# Patient Record
Sex: MALE | Race: OTHER | HISPANIC OR LATINO | URBAN - METROPOLITAN AREA
[De-identification: names, ages, dates, MRNs, and addresses within clinical notes are randomized per-mention and may not be internally consistent; named-entity substitution may affect disease eponyms.]

---

## 2021-01-25 ENCOUNTER — OUTPATIENT (OUTPATIENT)
Dept: OUTPATIENT SERVICES | Facility: HOSPITAL | Age: 26
LOS: 1 days | End: 2021-01-25
Payer: MEDICAID

## 2021-01-25 DIAGNOSIS — Z20.828 CONTACT WITH AND (SUSPECTED) EXPOSURE TO OTHER VIRAL COMMUNICABLE DISEASES: ICD-10-CM

## 2021-01-25 LAB — SARS-COV-2 RNA SPEC QL NAA+PROBE: SIGNIFICANT CHANGE UP

## 2021-01-25 PROCEDURE — C9803: CPT

## 2021-01-25 PROCEDURE — U0005: CPT

## 2021-01-25 PROCEDURE — U0003: CPT

## 2021-01-26 DIAGNOSIS — Z20.828 CONTACT WITH AND (SUSPECTED) EXPOSURE TO OTHER VIRAL COMMUNICABLE DISEASES: ICD-10-CM

## 2022-03-28 ENCOUNTER — EMERGENCY (EMERGENCY)
Facility: HOSPITAL | Age: 27
LOS: 1 days | Discharge: HOME | End: 2022-03-28
Attending: STUDENT IN AN ORGANIZED HEALTH CARE EDUCATION/TRAINING PROGRAM
Payer: MEDICAID

## 2022-03-28 VITALS
HEART RATE: 96 BPM | HEIGHT: 62 IN | TEMPERATURE: 98 F | RESPIRATION RATE: 18 BRPM | WEIGHT: 160.06 LBS | SYSTOLIC BLOOD PRESSURE: 137 MMHG | DIASTOLIC BLOOD PRESSURE: 89 MMHG | OXYGEN SATURATION: 100 %

## 2022-03-28 LAB
APPEARANCE UR: CLEAR — SIGNIFICANT CHANGE UP
BACTERIA # UR AUTO: ABNORMAL
BILIRUB UR-MCNC: NEGATIVE — SIGNIFICANT CHANGE UP
COLOR SPEC: YELLOW — SIGNIFICANT CHANGE UP
DIFF PNL FLD: NEGATIVE — SIGNIFICANT CHANGE UP
EPI CELLS # UR: SIGNIFICANT CHANGE UP
GLUCOSE UR QL: NEGATIVE MG/DL — SIGNIFICANT CHANGE UP
KETONES UR-MCNC: NEGATIVE — SIGNIFICANT CHANGE UP
LEUKOCYTE ESTERASE UR-ACNC: NEGATIVE — SIGNIFICANT CHANGE UP
NITRITE UR-MCNC: NEGATIVE — SIGNIFICANT CHANGE UP
PH UR: 5 — SIGNIFICANT CHANGE UP (ref 5–8)
PROT UR-MCNC: NEGATIVE — SIGNIFICANT CHANGE UP
RAPID RVP RESULT: SIGNIFICANT CHANGE UP
RBC CASTS # UR COMP ASSIST: SIGNIFICANT CHANGE UP /HPF (ref 0–4)
SARS-COV-2 RNA SPEC QL NAA+PROBE: SIGNIFICANT CHANGE UP
SP GR SPEC: 1.02 — SIGNIFICANT CHANGE UP (ref 1.01–1.02)
UROBILINOGEN FLD QL: NEGATIVE MG/DL — SIGNIFICANT CHANGE UP
WBC UR QL: SIGNIFICANT CHANGE UP /HPF (ref 0–5)

## 2022-03-28 PROCEDURE — 99285 EMERGENCY DEPT VISIT HI MDM: CPT | Mod: 25

## 2022-03-28 PROCEDURE — 93005 ELECTROCARDIOGRAM TRACING: CPT

## 2022-03-28 PROCEDURE — 87086 URINE CULTURE/COLONY COUNT: CPT

## 2022-03-28 PROCEDURE — 81001 URINALYSIS AUTO W/SCOPE: CPT

## 2022-03-28 PROCEDURE — 82962 GLUCOSE BLOOD TEST: CPT

## 2022-03-28 PROCEDURE — 93010 ELECTROCARDIOGRAM REPORT: CPT

## 2022-03-28 PROCEDURE — 99284 EMERGENCY DEPT VISIT MOD MDM: CPT

## 2022-03-28 PROCEDURE — 96372 THER/PROPH/DIAG INJ SC/IM: CPT

## 2022-03-28 PROCEDURE — 0225U NFCT DS DNA&RNA 21 SARSCOV2: CPT

## 2022-03-28 PROCEDURE — 71045 X-RAY EXAM CHEST 1 VIEW: CPT

## 2022-03-28 PROCEDURE — 71045 X-RAY EXAM CHEST 1 VIEW: CPT | Mod: 26

## 2022-03-28 RX ORDER — IBUPROFEN 200 MG
600 TABLET ORAL ONCE
Refills: 0 | Status: COMPLETED | OUTPATIENT
Start: 2022-03-28 | End: 2022-03-28

## 2022-03-28 RX ORDER — DEXAMETHASONE 0.5 MG/5ML
10 ELIXIR ORAL ONCE
Refills: 0 | Status: COMPLETED | OUTPATIENT
Start: 2022-03-28 | End: 2022-03-28

## 2022-03-28 RX ORDER — METHOCARBAMOL 500 MG/1
1 TABLET, FILM COATED ORAL
Qty: 30 | Refills: 0
Start: 2022-03-28 | End: 2022-04-06

## 2022-03-28 RX ORDER — METHOCARBAMOL 500 MG/1
1500 TABLET, FILM COATED ORAL ONCE
Refills: 0 | Status: COMPLETED | OUTPATIENT
Start: 2022-03-28 | End: 2022-03-28

## 2022-03-28 RX ORDER — ACETAMINOPHEN 500 MG
975 TABLET ORAL ONCE
Refills: 0 | Status: COMPLETED | OUTPATIENT
Start: 2022-03-28 | End: 2022-03-28

## 2022-03-28 RX ORDER — LIDOCAINE 4 G/100G
1 CREAM TOPICAL ONCE
Refills: 0 | Status: COMPLETED | OUTPATIENT
Start: 2022-03-28 | End: 2022-03-28

## 2022-03-28 RX ADMIN — Medication 600 MILLIGRAM(S): at 15:36

## 2022-03-28 RX ADMIN — METHOCARBAMOL 1500 MILLIGRAM(S): 500 TABLET, FILM COATED ORAL at 15:36

## 2022-03-28 RX ADMIN — Medication 975 MILLIGRAM(S): at 15:36

## 2022-03-28 RX ADMIN — LIDOCAINE 1 PATCH: 4 CREAM TOPICAL at 15:37

## 2022-03-28 RX ADMIN — Medication 10 MILLIGRAM(S): at 15:36

## 2022-03-28 NOTE — ED PROVIDER NOTE - ATTENDING CONTRIBUTION TO CARE
The patient seen and examined    Lumbosacral pain    I, Jose Antonio Bal, performed the initial face to face bedside interview with this patient regarding history of present illness, review of symptoms and relevant past medical, social and family history.  I completed an independent physical examination.  I was the initial provider who evaluated this patient. I have signed out the follow up of any pending tests (i.e. labs, radiological studies) to the resident.  I have communicated the patient’s plan of care and disposition with the resident

## 2022-03-28 NOTE — ED PROVIDER NOTE - NSFOLLOWUPCLINICSTOKEN_GEN_ALL_ED_FT
361276:1-3 Days|| ||00\01||False; 751535:1-3 Days|| ||00\01||False; 254597:1-3 Days|| ||00\01||False;

## 2022-03-28 NOTE — ED ADULT TRIAGE NOTE - INTERNATIONAL TRAVEL DAYS 1
0-6 days ([facility]) 0-6 days ([facility]) 0-6 days ([facility])

## 2022-03-28 NOTE — ED PROVIDER NOTE - PROGRESS NOTE DETAILS
JK - ECG, UA, CXR WNL. Patient reports significant improvement in his HA and lower back pain with Robaxin, Decadron, Tylenol, Motrin. Robaxin scripts sent to pharmacy; VSS, resting comfortably, in no apparent distress, currently asymptomatic. Ready and agreeable to DC with close PCP follow up and return precautions.

## 2022-03-28 NOTE — ED PROVIDER NOTE - PHYSICAL EXAMINATION
Constitutional: Well appearing, awake, alert, oriented to person, place, and time/situation and in no apparent distress  ENMT: Airway patent nasal mucosa clear. Mouth with normal mucosa. Throat has no vesicles no oropharyngeal exudates and uvula is midline. No blood in the oropharynx  EYES: clear bilaterally, pupils equal, round and reactive to light  Cardiac: Regular rate, regular rhythm. Heart sounds S1, S2. No murmurs, rubs or gallops. Good capillary refill, 2+ pulses, no peripheral edema  Respiratory: Lungs CTAB, no use of accessory muscles, no crackles, satting 99% on RA in no distress  Gastrointestinal: Abdomen nondistended, non-tender, no rebound guarding or peritoneal signs  Genitourinary: No CVA tenderness, pelvis nontender, bladder nondistended  Musculoskeletal: Spine appears normal, range of motion is not limited, no midline spinal tenderness, mild R paraspinal tenderness, no stepoffs or deformities, sensation and reflexes intact, negative straight leg, 2+ pulses, capillary refill < 2 seconds, ambulating on his own without assistance   Neurological: Alert and oriented, no focal deficits, no motor or sensory deficits. CN 2-12 intact, PERRLA, EOMI, No FND, moving all extremities equally, sensation intact, No dysmetria, no ataxia, negative heel-shin, 5/5 strength   Skin: Skin normal color for race, warm, dry and intact, No evidence of rash

## 2022-03-28 NOTE — ED ADULT NURSE NOTE - NSIMPLEMENTINTERV_GEN_ALL_ED
Implemented All Universal Safety Interventions:  [place] to call system. Call bell, personal items and telephone within reach. Instruct patient to call for assistance. Room bathroom lighting operational. Non-slip footwear when patient is off stretcher. Physically safe environment: no spills, clutter or unnecessary equipment. Stretcher in lowest position, wheels locked, appropriate side rails in place. Implemented All Universal Safety Interventions:  [place] to call system. Call bell, personal items and telephone within reach. Instruct patient to call for assistance. Room bathroom lighting operational. Non-slip footwear when patient is off stretcher. Physically safe environment: no spills, clutter or unnecessary equipment. Stretcher in lowest position, wheels locked, appropriate side rails in place. Implemented All Universal Safety Interventions:  [place] to call system. Call bell, personal items and telephone within reach. Instruct patient to call for assistance. Room bathroom lighting operational. Non-slip footwear when patient is off stretcher. Physically safe environment: no spills, clutter or unnecessary equipment. Stretcher in lowest position, wheels locked, appropriate side rails in place.

## 2022-03-28 NOTE — ED PROVIDER NOTE - PROVIDER TOKENS
PROVIDER:[TOKEN:[[id number]]] PROVIDER:[TOKEN:[[id number]]] PROVIDER:[TOKEN:[[id number]]]

## 2022-03-28 NOTE — ED PROVIDER NOTE - NSFOLLOWUPCLINICS_GEN_ALL_ED_FT
[facility]oped[facility]  Orthopedics  [address] [place] [address]  Phone: [phone]  Fax:   Follow Up Time: 1-3 Days     [facility]  Orthopedics  [address] [place] [address]  Phone: [phone]  Fax:   Follow Up Time: 1-3 Days     [facility] General Orthoped[facility]  Orthopedics  [address] [place] [address]  Phone: [phone]  Fax:   Follow Up Time: 1-3 Days

## 2022-03-28 NOTE — ED PROVIDER NOTE - CARE PROVIDER_API CALL
Clifford De Anda)  Orthopaedic Surgery  [address] [place] [address]  Phone: [phone]  Fax: [phone]  Follow Up Time:    Clifford De Anda)  Orthopaedic Surgery  [address] [place] [address]  Phone: [phone]  Fax: [phone]  Follow Up Time:    Clifford De Anda)  Orthopaedic Surgery  [address] [place] [address]  Phone: [phone]  Fax: [phone]  Follow Up Time:

## 2022-03-28 NOTE — ED ADULT NURSE NOTE - OBJECTIVE STATEMENT
Assumed care of the Pt AOx3 in no acute distress. Pt complaining of lower back pain that is chronic for the past 2-3 years along with intermittent face tingling and HA. Pt Denies any chest pain, shortness of breath, nausea or dizziness at this time. Pt denies any trama to the area. Pt meds given as per orders, labs sent as per orders pt educated on plan of care, pt able to successfully teach back plan of care to RN, RN will continue to reeducate pt during hospital stay.

## 2022-03-28 NOTE — ED PROVIDER NOTE - PATIENT PORTAL LINK FT
You can access the FollowMyHealth Patient Portal offered by [facility] by registering at the following website: [email or web address]. By joining [employer]’s FollowMyHealth portal, you will also be able to view your health information using other applications (apps) compatible with our system. You can access the FollowMyHealth Patient Portal offered by [facility] by registering at the following website: [email or web address]. By joining [employer]’s FollowMyHealth portal, you will also be able to view your health information using other applications (apps) compatible with our system. You can access the FollowMyHealth Patient Portal offered by [facility] by registering at the following website: [email or web address]. By joining [employer]’s FollowMyHealth portal, you will also be able to view your health information using other applications (apps) compatible with our system.

## 2022-03-28 NOTE — ED ADULT NURSE NOTE - NSFALLRSKPASTHIST_ED_ALL_ED
no Detail Level: Simple Comment: Pt has history of vague autoimmune Sx, diagnosis not clear (at least to me), is seeing rheum\\nWith intermittent scaling scalp rash \\nFavor seb derm, but cannot rule out scalp dermatomyositis rash without biopsy\\nWill biopsy to r/o cutaneous connective tissue disease

## 2022-03-28 NOTE — ED PROVIDER NOTE - CLINICAL SUMMARY MEDICAL DECISION MAKING FREE TEXT BOX
Patient is a 27 yo male with no significant PMHx presenting with chronic back pain, HA, and 2 months of dull CP and SOB. Patient states he has had chronic R paraspinal back pain for 2-3 years for which he was seeing a PT without any improvement; he has also had a chronic dull 5/10 intermittent bilateral frontal HA for approximately the same time period. Denies any n/v, fevers, headstrike, LOC, falls blood thinners; patient has felt intermittent L facial tingling when the HA comes on. Patient states he has been in [facility] for the past 3 months and just returned this past Saturday; he has had 2 months of SOB at rest, dull 5/10 intermittent nonradiating CP. Patient is vaccinated x 3, denies cough congestion rhinorrhea or sick contacts. He has not been taking any medications for his symptoms. Patient also endorsing 1 month of increased urinary frequency and dysuria, denies any sexual activity. VSS, lungs CTAB, equal pulses, mild R paraspinal lumbosacral TTP no midline tenderness, full ROM at hips knees ankles, ambulating on his own without assistance. UA and Ucx to r/o UTI; POCT glucose to r/o hyperglycemia. ECG to r/o ACS, RVP CXR to r/o URI vs. PNA. Tylenol, Motrin, robaxin, dexamethasone for lumbosacral strain/radiculopathy and chronic HA. Will continue to monitor. Patient is a 27 yo male with no significant PMHx presenting with chronic back pain, HA, and 2 months of dull CP and SOB. Patient states he has had chronic R paraspinal back pain for 2-3 years for which he was seeing a PT without any improvement; he has also had a chronic dull 5/10 intermittent bilateral frontal HA for approximately the same time period. Denies any n/v, fevers, headstrike, LOC, falls blood thinners; patient has felt intermittent L facial tingling when the HA comes on. Patient states he has been in [facility] for the past 3 months and just returned this past Saturday; he has had 2 months of SOB at rest, dull 5/10 intermittent nonradiating CP. Patient is vaccinated x 3, denies cough congestion rhinorrhea or sick contacts. He has not been taking any medications for his symptoms. Patient also endorsing 1 month of increased urinary frequency and dysuria, denies any sexual activity. VSS, lungs CTAB, equal pulses, mild R paraspinal lumbosacral TTP no midline tenderness, full ROM at hips knees ankles, ambulating on his own without assistance. UA and Ucx to r/o UTI; POCT glucose to r/o hyperglycemia. ECG to r/o ACS, RVP CXR to r/o URI vs. PNA. Tylenol, Motrin, robaxin, dexamethasone for lumbosacral strain/radiculopathy and chronic HA. Will continue to monitor. Patient is a 27 yo male with no significant PMHx presenting with chronic back pain, HA, and 2 months of dull CP and SOB. Patient states he has had chronic R paraspinal back pain for 2-3 years for which he was seeing a PT without any improvement; he has also had a chronic dull 5/10 intermittent bilateral frontal HA for approximately the same time period. Denies any n/v, fevers, headstrike, LOC, falls blood thinners; patient has felt intermittent L facial tingling when the HA comes on. Patient states he has been in [facility] for the past 3 months and just returned this past Saturday; he has had 2 months of SOB at rest, dull 5/10 intermittent nonradiating CP. Patient is vaccinated x 3, denies cough congestion rhinorrhea or sick contacts. He has not been taking any medications for his symptoms. Patient also endorsing 1 month of increased urinary frequency and dysuria, denies any sexual activity. VSS, lungs CTAB, equal pulses, mild R paraspinal lumbosacral TTP no midline tenderness, full ROM at hips knees ankles, ambulating on his own without assistance. UA and Ucx to r/o UTI; POCT glucose to r/o hyperglycemia. ECG to r/o ACS, RVP CXR to r/o URI vs. PNA. Tylenol, Motrin, robaxin, dexamethasone for lumbosacral strain/radiculopathy and chronic HA. Will continue to monitor.

## 2022-03-28 NOTE — ED PROVIDER NOTE - CARE PROVIDERS DIRECT ADDRESSES
,[email or web address]

## 2022-03-28 NOTE — ED ADULT TRIAGE NOTE - CHIEF COMPLAINT QUOTE
patient c/o back pain for months, the past week patient has been having headaches and eye pain getting worse today.

## 2022-03-28 NOTE — ED PROVIDER NOTE - INTERNATIONAL TRAVEL DAYS 1
0-6 days ([facility]) 0-6 days ([facility]) 0-6 days ([facility])

## 2022-03-28 NOTE — ED PROVIDER NOTE - OBJECTIVE STATEMENT
Patient is a 27 yo male with no significant PMHx presenting with chronic back pain, HA, and 2 months of dull CP and SOB. Patient states he has had chronic R paraspinal back pain for 2-3 years for which he was seeing a PT without any improvement; he has also had a chronic dull 5/10 intermittent bilateral frontal HA for approximately the same time period. Denies any n/v, fevers, headstrike, LOC, falls blood thinners; patient has felt intermittent L facial tingling when the HA comes on. Patient states he has been in [facility] for the past 3 months and just returned this past Saturday; he has had 2 months of SOB at rest, dull 5/10 intermittent nonradiating CP, denies any syncope/dizziness/lightheadedness/cardiac history. Patient is vaccinated x 3, denies cough congestion rhinorrhea or sick contacts. He has not been taking any medications for his symptoms. Patient also endorsing 1 month of increased urinary frequency and dysuria, denies any sexual activity. Currently complaining of R paraspinal lower back pain, no CP or SOB. Denies fevers, chills, dizziness, lightheadedness, dysphagia, dysarthria, diplopia, photophobia, syncope, cough, congestion, abdominal pain, neck pain, diarrhea, hematuria, hematochezia, hematemesis, n/v, recent travel, sick contacts, leg swelling. Patient is a 25 yo male with no significant PMHx presenting with chronic back pain, HA, and 2 months of dull CP and SOB. Patient states he has had chronic R paraspinal back pain for 2-3 years for which he was seeing a PT without any improvement; he has also had a chronic dull 5/10 intermittent bilateral frontal HA for approximately the same time period. Denies any n/v, fevers, headstrike, LOC, falls blood thinners; patient has felt intermittent L facial tingling when the HA comes on. Patient states he has been in [facility] for the past 3 months and just returned this past Saturday; he has had 2 months of SOB at rest, dull 5/10 intermittent nonradiating CP, denies any syncope/dizziness/lightheadedness/cardiac history. Patient is vaccinated x 3, denies cough congestion rhinorrhea or sick contacts. He has not been taking any medications for his symptoms. Patient also endorsing 1 month of increased urinary frequency and dysuria, denies any sexual activity. Currently complaining of R paraspinal lower back pain, no CP or SOB. Denies fevers, chills, dizziness, lightheadedness, dysphagia, dysarthria, diplopia, photophobia, syncope, cough, congestion, abdominal pain, neck pain, diarrhea, hematuria, hematochezia, hematemesis, n/v, recent travel, sick contacts, leg swelling. Patient is a 27 yo male with no significant PMHx presenting with chronic back pain, HA, and 2 months of dull CP and SOB. Patient states he has had chronic R paraspinal back pain for 2-3 years for which he was seeing a PT without any improvement; he has also had a chronic dull 5/10 intermittent bilateral frontal HA for approximately the same time period. Denies any n/v, fevers, headstrike, LOC, falls blood thinners; patient has felt intermittent L facial tingling when the HA comes on. Patient states he has been in [facility] for the past 3 months and just returned this past Saturday; he has had 2 months of SOB at rest, dull 5/10 intermittent nonradiating CP, denies any syncope/dizziness/lightheadedness/cardiac history. Patient is vaccinated x 3, denies cough congestion rhinorrhea or sick contacts. He has not been taking any medications for his symptoms. Patient also endorsing 1 month of increased urinary frequency and dysuria, denies any sexual activity. Currently complaining of R paraspinal lower back pain, no CP or SOB. Denies fevers, chills, dizziness, lightheadedness, dysphagia, dysarthria, diplopia, photophobia, syncope, cough, congestion, abdominal pain, neck pain, diarrhea, hematuria, hematochezia, hematemesis, n/v, recent travel, sick contacts, leg swelling.

## 2022-03-29 LAB
CULTURE RESULTS: SIGNIFICANT CHANGE UP
SPECIMEN SOURCE: SIGNIFICANT CHANGE UP